# Patient Record
(demographics unavailable — no encounter records)

---

## 2024-11-07 NOTE — SOCIAL HISTORY
[Apartment] : [unfilled] lives in an apartment  [Radiator/Baseboard] : heating provided by radiator(s)/baseboard(s) [Window Units] : air conditioning provided by window units [Humidifier] : uses a humidifier [Dehumidifier] : uses a dehumidifier [Bedroom] :  in bedroom [Living Area] : in living area [Dog] : dog [Smokers in Household] : there are no smokers in the home

## 2024-11-07 NOTE — ASSESSMENT
[FreeTextEntry1] : 1. ?FA/Urticaria - will do immunocap to shellfish, peanut. and will check lab w/u.

## 2024-11-07 NOTE — HISTORY OF PRESENT ILLNESS
[de-identified] : THIERNO DE LA ROSA is a 35 year yo female who is here today for possible allergies to peanuts and shrimp she states she was swelling and her throat felt like it was closing and swelling to face the first reaction from shrimp was last year and they later on that year she had crabs and also felt her throat closing as well and then this year  she states they did happen right away they happen the next day she states she also get light headed with peanuts she states she would take Benadryl  which would help after a while but takes about a week to fully be better she states she does have epipen that she carries she has slightly high cholesterol and swelling to thyroid and slightly high blood sugar she suffer from.   She woke with pain and swelling the next day after shrimp. It took 5 days to resolve. She did not notice any itching. She noticed some throat tightness with crab cake, She took benedryl and felt better. This year in september she had pad English and felt headache light headedness and throat closing. She took some benedryl. She had seen an allergist before.

## 2024-11-07 NOTE — HISTORY OF PRESENT ILLNESS
[de-identified] : THIERNO DE LA ROSA is a 35 year yo female who is here today for possible allergies to peanuts and shrimp she states she was swelling and her throat felt like it was closing and swelling to face the first reaction from shrimp was last year and they later on that year she had crabs and also felt her throat closing as well and then this year  she states they did happen right away they happen the next day she states she also get light headed with peanuts she states she would take Benadryl  which would help after a while but takes about a week to fully be better she states she does have epipen that she carries she has slightly high cholesterol and swelling to thyroid and slightly high blood sugar she suffer from.   She woke with pain and swelling the next day after shrimp. It took 5 days to resolve. She did not notice any itching. She noticed some throat tightness with crab cake, She took benedryl and felt better. This year in september she had pad Macedonian and felt headache light headedness and throat closing. She took some benedryl. She had seen an allergist before.

## 2024-11-19 NOTE — ASSESSMENT
[FreeTextEntry1] : 1. Urticaria - immunocap to shellfish, peanut negative, lab w/u for urticaria negative - consider psychogenic etiology also.

## 2024-11-19 NOTE — PHYSICAL EXAM
[Alert] : alert [Well Nourished] : well nourished [Healthy Appearance] : healthy appearance [No Acute Distress] : no acute distress [Well Developed] : well developed [No Discharge] : no discharge [Normal Pupil & Iris Size/Symmetry] : normal pupil and iris size and symmetry [No Photophobia] : no photophobia [Sclera Not Icteric] : sclera not icteric [Normal TMs] : both tympanic membranes were normal [Normal Nasal Mucosa] : the nasal mucosa was normal [Normal Lips/Tongue] : the lips and tongue were normal [Normal Outer Ear/Nose] : the ears and nose were normal in appearance [Normal Tonsils] : normal tonsils [No Thrush] : no thrush [Pale mucosa] : pale mucosa [Supple] : the neck was supple [Normal Rate and Effort] : normal respiratory rhythm and effort [No Crackles] : no crackles [No Retractions] : no retractions [Bilateral Audible Breath Sounds] : bilateral audible breath sounds [Normal Rate] : heart rate was normal  [Normal S1, S2] : normal S1 and S2 [No murmur] : no murmur [Regular Rhythm] : with a regular rhythm [Skin Intact] : skin intact  [No Rash] : no rash [No Skin Lesions] : no skin lesions [Normal Mood] : mood was normal [Normal Affect] : affect was normal [Alert, Awake, Oriented as Age-Appropriate] : alert, awake, oriented as age appropriate

## 2024-11-19 NOTE — HISTORY OF PRESENT ILLNESS
[de-identified] : THIERNO DE LA ROSA is a 35 year yo female who is here today for possible allergies to peanuts and shrimp she states she was swelling and her throat felt like it was closing and swelling to face the first reaction from shrimp was last year and they later on that year she had crabs and also felt her throat closing as well and then this year she states they did happen right away they happen the next day she states she also get light headed with peanuts she states she would take Benadryl which would help after a while but takes about a week to fully be better she states she does have epipen that she carries she has slightly high cholesterol and swelling to thyroid and slightly high blood sugar she suffer from. She woke with pain and swelling the next day after shrimp. It took 5 days to resolve. She did not notice any itching. She noticed some throat tightness with crab cake, She took Benadryl and felt better. This year in September she had pad Persian and felt headache light headedness and throat closing. She took some Benadryl. She had seen an allergist before. It is notable the patient has depression for which see is seeing a therapist and a psychiatrist for.   She is here for her lab workup. She states when she's eating, she's getting pain in her throat, when she has foods high in sugar she gets heart palpitations, dizziness, nausea and lightheadedness.